# Patient Record
Sex: FEMALE | URBAN - METROPOLITAN AREA
[De-identification: names, ages, dates, MRNs, and addresses within clinical notes are randomized per-mention and may not be internally consistent; named-entity substitution may affect disease eponyms.]

---

## 2020-06-30 ENCOUNTER — IMPORTED ENCOUNTER (OUTPATIENT)
Dept: URBAN - METROPOLITAN AREA CLINIC 31 | Facility: CLINIC | Age: 45
End: 2020-06-30

## 2020-06-30 PROBLEM — H11.003: Noted: 2020-06-30

## 2020-06-30 PROBLEM — H16.221: Noted: 2020-06-30

## 2020-06-30 PROCEDURE — 92002 INTRM OPH EXAM NEW PATIENT: CPT

## 2020-06-30 NOTE — PATIENT DISCUSSION
Keratoconjunctivitis sicca OD:  Discussed treatment options frequent AT Restasis [place] or [place] to stimulate increased tear production. Drops may take 3-6 months for full effect.  Start

## 2020-06-30 NOTE — PATIENT DISCUSSION
1.  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 observe 2. Keratoconjunctivitis sicca OD: F/u 1 week. will treat with Pred gtts x 1 week.  Will use punctal plug after that OD

## 2021-01-07 NOTE — PATIENT DISCUSSION
We discussed dilation and irrigation of puncta and risks involved including, but not limited to, damage to the cannalicular system.

## 2021-01-07 NOTE — PATIENT DISCUSSION
pt has h/o 2 chalazion removals LLL. Punctum fully closed with overlying tissue. Dilated punctum today w/ no complications.

## 2021-01-07 NOTE — PROCEDURE NOTE: CLINICAL
PROCEDURE NOTE: Dilation of Lacrimal Punctum, With or Without Irrigation #1 Left Lower Lid. Prior to treatment, the risks/benefits/alternatives were discussed. The patient wished to proceed with procedure. The punctum was dilated with a punctum dilator. A 1cc syringe and cannula were used to irrigate saline in to the canaliculus. Patient tolerated procedure well. There were no complications. Post op instructions given. Gerber Archer

## 2022-01-26 NOTE — PATIENT DISCUSSION
pt wants to d/c restasis due to cost. Advised her that is ok to do; cont. tears but start Pred. Acetate BID OU for 1-2 weeks after stopping Restasis.

## 2022-01-26 NOTE — PATIENT DISCUSSION
pt has h/o 2 chalazion removals LLL. Punctum only marginally open today; consult with Agusto Haider for +/- D&I in the future PRN.

## 2022-03-15 NOTE — PROCEDURE NOTE: CLINICAL
PROCEDURE NOTE: Probing of Lacrimal Canaliculi, With or Without Irrigation OS. Diagnosis: Epiphora Due to Insufficient Drainage. Prep: Betadine Flush. Risks, benefits and alternatives discussed. The patient desires to proceed with probe and irrigation of the involved puncta today and the puncta/lacrimal system was found to be patent/blocked. See chart plan notes for further discussion. Patient tolerated the procedure well and left in good condition. Pat Barclay

## 2022-03-15 NOTE — PATIENT DISCUSSION
Recommended Probing and Irrigation. OS met initial resistence, then flushed through to throat, small punctum noted.    Discussed possible need for 3-snip to open tear duct for proper tear drainage, noting REJI symptoms may worsen.  Patient is to monitor for improvement and RTC for periodical P&I for maintenance.  If patient has worsening of symptoms, RTC to discuss procedure 3-snip further.

## 2022-03-28 NOTE — PATIENT DISCUSSION
3/28/22: pt has been off Restasis for about 2 months; start Pred Acetate BID OS x 1-2 weeks.  If no improvement with tearing/discomfort, will switch to [place].

## 2022-03-28 NOTE — PATIENT DISCUSSION
3/15/22 KK: Recommended Probing and Irrigation. OS met initial resistence, then flushed through to throat, small punctum noted.    Discussed possible need for 3-snip to open tear duct for proper tear drainage, noting REJI symptoms may worsen.  Patient is to monitor for improvement and RTC for periodical P&I for maintenance.  If patient has worsening of symptoms, RTC to discuss procedure 3-snip further.

## 2022-04-02 ASSESSMENT — VISUAL ACUITY
OD_CC: 20/20
OS_CC: 20/20
OS_SC: J3
OD_SC: J3

## 2022-09-08 NOTE — PATIENT DISCUSSION
cont. Helen BETH OU.  Gave pt blood work today to r/o Sjogren's and recommended to see a rheumatologist.

## 2022-11-01 NOTE — PROCEDURE NOTE: CLINICAL
PROCEDURE NOTE: Probing of Lacrimal Canaliculi, With or Without Irrigation OS. Diagnosis: Epiphora Due to Insufficient Drainage. Prep: Betadine Flush. Risks, benefits and alternatives discussed. The patient desires to proceed with probe and irrigation of the involved puncta today and the puncta/lacrimal system was found to be patent/blocked. See chart plan notes for further discussion. Patient tolerated the procedure well and left in good condition. Emilia Duncan